# Patient Record
Sex: FEMALE | Race: WHITE | NOT HISPANIC OR LATINO | Employment: UNEMPLOYED | URBAN - METROPOLITAN AREA
[De-identification: names, ages, dates, MRNs, and addresses within clinical notes are randomized per-mention and may not be internally consistent; named-entity substitution may affect disease eponyms.]

---

## 2022-01-18 ENCOUNTER — TELEPHONE (OUTPATIENT)
Dept: TRANSPLANT | Facility: CLINIC | Age: 62
End: 2022-01-18

## 2022-01-18 NOTE — TELEPHONE ENCOUNTER
Patient is currently listed at [facility], she reports she was turned down by [facility], [facility], and [facility]. Worked up at [facility] as well. Is considering referral to [facility] as well, she states she lived there for years. Reports GFR 18, she is pre-dialysis.    Type one IDDM for 51 years.    Patient provided with address to transplant team, fax number for her provider to send referral information, notes/demographics/insurance/labs. She states she will send CD of CT abd/pelvis done November / December 2021.    She reports she has two sisters as well, that are being worked up as donors.    ----- Message from Henrry Davis sent at 1/18/2022  2:24 PM CST -----  Regarding: CALL BACK  PT call to speak Dr Monte in regards to some question she has requesting call back    Call

## 2022-02-02 ENCOUNTER — TELEPHONE (OUTPATIENT)
Dept: TRANSPLANT | Facility: CLINIC | Age: 62
End: 2022-02-02

## 2022-02-02 ENCOUNTER — DOCUMENTATION ONLY (OUTPATIENT)
Dept: TRANSPLANT | Facility: CLINIC | Age: 62
End: 2022-02-02

## 2022-02-02 NOTE — PROGRESS NOTES
Received CT abd/pelvis dated 12/15/20 & 11/22/21. Notified patient information is received, routed CD of CT's to film library for upload. Will route to surgeon for review when uploaded.

## 2022-02-02 NOTE — TELEPHONE ENCOUNTER
Contacted patient to notify of receipt of imaging. Left voicemail, provided return phone number if any questions.

## 2022-03-02 ENCOUNTER — TELEPHONE (OUTPATIENT)
Dept: TRANSPLANT | Facility: CLINIC | Age: 62
End: 2022-03-02

## 2022-03-02 NOTE — TELEPHONE ENCOUNTER
Returned patient call, she is aware that she is pending surgeon review of imaging in order to proceed with referral [place]      ----- Message from Henrry Davis sent at 3/2/2022 11:24 AM CST -----  Regarding: call back  Pt call to speak with Misty in regards to pt's CT requesting call back    Call

## 2022-03-15 ENCOUNTER — TELEPHONE (OUTPATIENT)
Dept: TRANSPLANT | Facility: CLINIC | Age: 62
End: 2022-03-15

## 2022-03-15 NOTE — TELEPHONE ENCOUNTER
Contacted patient to notify of authorization to proceed with referral per transplant surgeon. Patient verbalized understanding that she is considered at high risk. She reports she has been evaluated in person at [facility], [facility], [facility], and [facility]. She reports that she is still in process with having her sister evaluated at [facility]. She states she is aware that her provider (Dr. Cecy Reilly) will need to complete referral form and submit records (contacted office, obtained fax number-[phone]). All questions answered at this time.    ----- Message from Khris Wilburn Jr., MD sent at 3/15/2022  1:35 PM CDT -----  There are areas on the common iliac arteries that are feasible, but remains high risk. Proceed with eval.   ----- Message -----  From: Misty Stewart  Sent: 3/15/2022   1:21 PM CDT  To: Khris Wilburn Jr., MD    As of January her two sisters were being worked up, but not completed. My take away was she is looking for multi-center listing, she appears to be listed at [facility] as well as [facility] (per patient report). Please advise if she appears to be a candidate or if her vasculature would prohibit transplant. Thank you!    This is from a note from [facility] (April 2021):     Past Medical history:  - CKD4 presumably from Type 1 DM, kidney disease was diagnosed 18 yrs ago. Hisorically she hasnt had much proteinuria though  - Type 1 DM diagnosed at age 10. She does have diabetic retinopathy, gastroperesis and hypoglycemic unawareness. On insulin and dexcom monitor. 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 Viral infections positive for: VZV    Exercise tolerance:   Can walk a mile    Overall physical fitness by Karnofsky:  90 - Able to carry on normal activity; minor signs or symptoms of disease     Infections:   None    Cancers:   None    Coagulopathy issues:  None    Problems with General anesthesia:   Not known            I think she is trying to   ----- Message -----  From: Khris Wilburn Jr., MD  Sent: 3/15/2022  12:43 PM CDT  To: Misty Stewart    Its pretty marginal.  The external iliac arteries are not usable.  The common seems possible on CT from 11/2021, but only if shes a very excellent candidate otherwise.    I see she's listed at [facility], so I take it she doesn't have a living donor?     DS    ----- Message -----  From: Misty Stewart  Sent: 3/2/2022  11:51 AM CDT  To: Khris Wilburn Jr., MD    Please review these CT's / outside imaging. Patient self-referred and is aware she has been declined at other centers due to imaging. Please advise if ok to proceed with kidney transplant referral based on review. Thank you.

## 2022-03-23 ENCOUNTER — TELEPHONE (OUTPATIENT)
Dept: TRANSPLANT | Facility: CLINIC | Age: 62
End: 2022-03-23

## 2022-03-29 ENCOUNTER — TELEPHONE (OUTPATIENT)
Dept: TRANSPLANT | Facility: CLINIC | Age: 62
End: 2022-03-29

## 2022-03-29 DIAGNOSIS — Z76.82 ORGAN TRANSPLANT CANDIDATE: Primary | ICD-10-CM

## 2022-03-29 NOTE — TELEPHONE ENCOUNTER
Arranged test kit to be sent to patient for crossmatch.  All questions answered and patient verbalized understanding to all information provided.

## 2022-04-07 ENCOUNTER — TELEPHONE (OUTPATIENT)
Dept: TRANSPLANT | Facility: CLINIC | Age: 62
End: 2022-04-07
Payer: MEDICARE

## 2022-04-07 NOTE — TELEPHONE ENCOUNTER
Returned patient call, she reports she did speak with Rylan, and is aware requisite documentation has not been received. She states that her sister is being evaluated at [facility] now, and she is afraid she will need to schedule, although she would prefer surgery at [facility].     She states she will follow up with her local nephrologist to obtain needed referral forms and proceed from there. She is aware there are appointments available during April. She is requesting possibly April 26 or 27.    ----- Message from Henrry Davis sent at 4/7/2022 10:53 AM CDT -----  Regarding: call back  Pt call to speak with Misty in regards to her being excepted at another hospital for surgery     Call

## 2022-04-18 ENCOUNTER — TELEPHONE (OUTPATIENT)
Dept: TRANSPLANT | Facility: CLINIC | Age: 62
End: 2022-04-18
Payer: MEDICARE

## 2022-04-18 NOTE — TELEPHONE ENCOUNTER
"Returned patient call, she is aware referral was received (pages 25-33) on Friday, 4/14 (offices were closed). She is aware the referral will be processed as quickly as possible. We will need complete referral, this will be requested by MA. Patient is attempting to come to [facility] on 4/26, 27, or 28.    All questions answered at this time.   ----- Message from Nick Hendrix RN sent at 4/18/2022  3:09 PM CDT -----  Regarding: FW: speak to coordinator    ----- Message -----  From: Karuna Espino  Sent: 4/18/2022   3:04 PM CDT  To: [facility] Pre-Kidney Transplant Clinical  Subject: speak to coordinator                             Patient calling to speak to coordinator regarding patient status.    Is patient scheduled for 4/27/2022 as she was instructed to  "pencil in" that date? [facility] is ready to schedule transplant in May if [facility] is not ready. Patient prefers [facility]. Patient can stay in town longer if she knows the status. Is there anything else we are still waiting for?    Call: [phone] (Mobile)        "

## 2022-04-19 ENCOUNTER — TELEPHONE (OUTPATIENT)
Dept: TRANSPLANT | Facility: CLINIC | Age: 62
End: 2022-04-19
Payer: MEDICARE

## 2022-04-19 ENCOUNTER — PATIENT MESSAGE (OUTPATIENT)
Dept: TRANSPLANT | Facility: CLINIC | Age: 62
End: 2022-04-19
Payer: MEDICARE

## 2022-04-19 DIAGNOSIS — Z76.82 ORGAN TRANSPLANT CANDIDATE: Primary | ICD-10-CM

## 2022-04-25 NOTE — PROGRESS NOTES
Spoke to patient to complete her history for her upcoming [facility] appointment her sister will come with her to her appointment she is aware that she have to bring a small breakfast/snack to eat after blood is drawn she will not need a

## 2022-04-26 ENCOUNTER — HOSPITAL ENCOUNTER (OUTPATIENT)
Dept: RADIOLOGY | Facility: HOSPITAL | Age: 62
Discharge: HOME | End: 2022-04-26
Attending: NURSE PRACTITIONER
Payer: MEDICARE

## 2022-04-26 ENCOUNTER — OFFICE VISIT (OUTPATIENT)
Dept: TRANSPLANT | Facility: CLINIC | Age: 62
End: 2022-04-26
Payer: MEDICARE

## 2022-04-26 VITALS
SYSTOLIC BLOOD PRESSURE: 133 MMHG | HEART RATE: 59 BPM | RESPIRATION RATE: 16 BRPM | OXYGEN SATURATION: 99 % | DIASTOLIC BLOOD PRESSURE: 63 MMHG | TEMPERATURE: 97 F | HEIGHT: 66 IN | BODY MASS INDEX: 25.01 KG/M2 | WEIGHT: 155.63 LBS

## 2022-04-26 DIAGNOSIS — Z76.82 ORGAN TRANSPLANT CANDIDATE: ICD-10-CM

## 2022-04-26 DIAGNOSIS — N18.4 TYPE 1 DIABETES MELLITUS WITH STAGE 4 CHRONIC KIDNEY DISEASE: ICD-10-CM

## 2022-04-26 DIAGNOSIS — I10 PRIMARY HYPERTENSION: ICD-10-CM

## 2022-04-26 DIAGNOSIS — E10.22 TYPE 1 DIABETES MELLITUS WITH STAGE 4 CHRONIC KIDNEY DISEASE: ICD-10-CM

## 2022-04-26 DIAGNOSIS — Z01.818 PRE-TRANSPLANT EVALUATION FOR KIDNEY TRANSPLANT: Primary | ICD-10-CM

## 2022-04-26 DIAGNOSIS — N25.81 SECONDARY HYPERPARATHYROIDISM: ICD-10-CM

## 2022-04-26 DIAGNOSIS — N18.4 CKD (CHRONIC KIDNEY DISEASE), STAGE IV: ICD-10-CM

## 2022-04-26 PROBLEM — E10.9 TYPE 1 DIABETES MELLITUS: Status: ACTIVE | Noted: 2022-04-26

## 2022-04-26 PROCEDURE — 99205 OFFICE O/P NEW HI 60 MIN: CPT | Mod: S$PBB,TXP,, | Performed by: NURSE PRACTITIONER

## 2022-04-26 PROCEDURE — 72170 X-RAY EXAM OF PELVIS: CPT | Mod: TC,TXP

## 2022-04-26 PROCEDURE — 97802 MEDICAL NUTRITION INDIV IN: CPT | Mod: PBBFAC,TXP | Performed by: DIETITIAN, REGISTERED

## 2022-04-26 PROCEDURE — 99205 PR OFFICE/OUTPT VISIT, NEW, LEVL V, 60-74 MIN: ICD-10-PCS | Mod: S$PBB,TXP,, | Performed by: NURSE PRACTITIONER

## 2022-04-26 PROCEDURE — 93978 VASCULAR STUDY: CPT | Mod: 26,TXP,, | Performed by: RADIOLOGY

## 2022-04-26 PROCEDURE — 76770 US EXAM ABDO BACK WALL COMP: CPT | Mod: 26,TXP,, | Performed by: RADIOLOGY

## 2022-04-26 PROCEDURE — 72170 X-RAY EXAM OF PELVIS: CPT | Mod: 26,TXP,, | Performed by: RADIOLOGY

## 2022-04-26 PROCEDURE — 99204 OFFICE O/P NEW MOD 45 MIN: CPT | Mod: S$PBB,TXP,, | Performed by: TRANSPLANT SURGERY

## 2022-04-26 PROCEDURE — 71046 X-RAY EXAM CHEST 2 VIEWS: CPT | Mod: TC,TXP

## 2022-04-26 PROCEDURE — 76770 US EXAM ABDO BACK WALL COMP: CPT | Mod: TC,TXP

## 2022-04-26 PROCEDURE — 72170 XR PELVIS ROUTINE AP: ICD-10-PCS | Mod: 26,TXP,, | Performed by: RADIOLOGY

## 2022-04-26 PROCEDURE — 99999 PR PBB SHADOW E&M-EST. PATIENT-LVL V: ICD-10-PCS | Mod: PBBFAC,TXP,, | Performed by: NURSE PRACTITIONER

## 2022-04-26 PROCEDURE — 93978 US DOPP ILIACS BILATERAL: ICD-10-PCS | Mod: 26,TXP,, | Performed by: RADIOLOGY

## 2022-04-26 PROCEDURE — 93978 VASCULAR STUDY: CPT | Mod: TC,TXP

## 2022-04-26 PROCEDURE — 71046 XR CHEST PA AND LATERAL: ICD-10-PCS | Mod: 26,TXP,, | Performed by: RADIOLOGY

## 2022-04-26 PROCEDURE — 99215 OFFICE O/P EST HI 40 MIN: CPT | Mod: PBBFAC,25,TXP | Performed by: NURSE PRACTITIONER

## 2022-04-26 PROCEDURE — 71046 X-RAY EXAM CHEST 2 VIEWS: CPT | Mod: 26,TXP,, | Performed by: RADIOLOGY

## 2022-04-26 PROCEDURE — 76770 US RETROPERITONEAL COMPLETE: ICD-10-PCS | Mod: 26,TXP,, | Performed by: RADIOLOGY

## 2022-04-26 PROCEDURE — 99999 PR PBB SHADOW E&M-EST. PATIENT-LVL V: CPT | Mod: PBBFAC,TXP,, | Performed by: NURSE PRACTITIONER

## 2022-04-26 PROCEDURE — 99204 PR OFFICE/OUTPT VISIT, NEW, LEVL IV, 45-59 MIN: ICD-10-PCS | Mod: S$PBB,TXP,, | Performed by: TRANSPLANT SURGERY

## 2022-04-26 RX ORDER — NIFEDIPINE 90 MG/1
90 TABLET, EXTENDED RELEASE ORAL DAILY
COMMUNITY

## 2022-04-26 RX ORDER — IPRATROPIUM BROMIDE 21 UG/1
2 SPRAY, METERED NASAL
COMMUNITY

## 2022-04-26 RX ORDER — FEXOFENADINE HCL 60 MG
60 TABLET ORAL DAILY PRN
COMMUNITY

## 2022-04-26 RX ORDER — INSULIN GLARGINE 100 [IU]/ML
14 INJECTION, SOLUTION SUBCUTANEOUS DAILY
COMMUNITY

## 2022-04-26 RX ORDER — METOPROLOL SUCCINATE 25 MG/1
25 TABLET, EXTENDED RELEASE ORAL DAILY
COMMUNITY

## 2022-04-26 RX ORDER — CALCITRIOL 0.25 UG/1
0.25 CAPSULE ORAL DAILY
COMMUNITY

## 2022-04-26 RX ORDER — MELATONIN 5 MG
5 CAPSULE ORAL NIGHTLY
COMMUNITY

## 2022-04-26 RX ORDER — BUPROPION HYDROCHLORIDE 150 MG/1
150 TABLET ORAL DAILY
COMMUNITY

## 2022-04-26 RX ORDER — INSULIN LISPRO 100 [IU]/ML
1-10 INJECTION, SOLUTION INTRAVENOUS; SUBCUTANEOUS
COMMUNITY

## 2022-04-26 RX ORDER — ESCITALOPRAM OXALATE 20 MG/1
20 TABLET ORAL DAILY
COMMUNITY

## 2022-04-26 RX ORDER — ROSUVASTATIN CALCIUM 5 MG/1
5 TABLET, COATED ORAL DAILY
COMMUNITY

## 2022-04-26 NOTE — PROGRESS NOTES
Transplant Recipient Adult Psychosocial Assessment    Cyndi Clark  [address] [address]  Telephone Information:   Mobile [phone]   Home  [phone] (home)  Work  There is no work phone number on file.  E-mail  [email or web address]    Sex: female  YOB: 1960  Age: 62 y.o.    Encounter Date: 2022  U[place] Citizen: yes  Primary Language: English   Needed: no    Emergency Contact:  Jennifer Reyes, 60 yo sister, [place], does drive/own car, does work part time in surgeons office.  [phone]    Family/Social Support:   Number of dependents/: pt reports lives with her 84 yo mother Indira Clark who suffers from problem back pain. Pt reports having family nearby who will assist with mother for patient's transplant.  Marital history: pt reports is not   Other family dynamics: Pt reports father is recently . Pt reports is on [employer] through her former employer [employer] and lives with her mother Indira Clark in [place]. Pt reports past work history as  for 13 years for [employer] in [place]. Pt reports moved from [place] in with her mother in [place] once her health became to problematic to continue working and living on her own in [place]. Pt reports having 6 highly supportive sisters with one sister Vero Enriquez living nearby in [facility] [place]. Pt reports having living donor sister Carmen Nguyen. Pt reports sister Vero Enriquez will be primary transplant caregiver with other sisters as back up transplant caregivers as needed.     Household Composition:  Indira Clark, 84 yo mother, [place], does drive/own car, retired. [phone]    Do you and your caregivers have access to reliable transportation? yes  PRIMARY CAREGIVER: Vero Enriquez, sister ([facility] [place]), will be primary caregiver, phone number [phone]    Vero Enriquez, 66 yo sister, [facility], does  drive/own car, does not work (was  for [employer] [facility] [employer] in the past).  [phone]     provided in-depth information to patient and caregiver regarding pre- and post-transplant caregiver role.   strongly encourages patient and caregiver to have concrete plan regarding post-transplant care giving, including back-up caregiver(s) to ensure care giving needs are met as needed.    Patient and Caregiver states understanding all aspects of caregiver role/commitment and is able/willing/committed to being caregiver to the fullest extent necessary.    Patient and Caregiver verbalizes understanding of the education provided today and caregiver responsibilities.         remains available. Patient and Caregiver agree to contact  in a timely manner if concerns arise.      Able to take time off work without financial concerns: yes.     Additional Significant Others who will Assist with Transplant:  Aparna Orozco, 59 yo sister [facility], does drive/own car, works from home as patient advocate. [phone]  Jennifer Amy, 60 yo sister[place]is [place], does drive/own car, does work part time in surgeons office.  [phone]  Janette Park, 62 yo sisterDonis, does drive/own car, school counselor. [phone]  Helen Cabrera, 64 yo sisterRADHALA, does drive/own car, works part time at home as . [phone]    Living Will: no  Healthcare Power of : no  Advance Directives on file: <<no information> per medical record.  Verbally reviewed LW/HCPA information.   provided patient with copy of LW/HCPA documents and provided education on completion of forms.    Living Donors: Education and resource information given to patient. Carmen Nguyen, 54 yo sister     Highest Education Level: Associate/Bachelor Degree  Reading Ability: college  Reports difficulty with: N/A  Learns Best By:  multisensory     Status: no  VA Benefits: no     Working for  Income: No  If no, reason not working: Disability  Pt reports is on LTD through her former employer, [employer] and on SS disability (both starting 2017 due to diabetes and Charcot foot) and lives with her 82 yo mother Indira Peterson in [place]. Pt reports past work history as  for 13 years at [employer] in [place]. Pt reports moved from [place] in with her mother in [place] once her health became to problematic to continue working and living on her own in [place].    Spouse/Significant Other Employment: pt reports is not     Disabled: yes 2017 due to Diabetes issues and Charchot foot    Monthly Income:  LTD income: $1300    SS disability income: $2,000  Able to afford all costs now and if transplanted, including medications: yes  Patient and Caregiver verbalizes understanding of personal responsibilities related to transplant costs and the importance of having a financial plan to ensure that patients transplant costs are fully covered.       provided fundraising information/education. Patient and Caregiververbalizes understanding.   remains available.    Insurance:   Payer/Plan Subscr  Sex Relation Sub. Ins. ID Effective Group Num   1. MEDICARE - ME* ILANA PETERSON JESSICA 1960 Female Self [id number] 3/1/19                                    [address]   2. BLUE CROSS BL* KRISTENILANA LOPEZ 1960 Female Self [id number] 3/1/19 [id number]                                   [address] [address]     Primary Insurance (for UNOS reporting): Public Insurance - Medicare FFS (Fee For Service)  Secondary Insurance (for UNOS reporting): Private Insurance  Patient and Caregiver verbalizes clear understanding that patient may experience difficulty obtaining and/or be denied insurance coverage post-surgery. This includes and is not limited to disability insurance, life insurance, health insurance, burial insurance, long term care insurance, and  other insurances.      Patient and Caregiver also reports understanding that future health concerns related to or unrelated to transplantation may not be covered by patient's insurance.  Resources and information provided and reviewed.     Patient and Caregiver provides verbal permission to release any necessary information to outside resources for patient care and discharge planning.  Resources and information provided are reviewed.      Dialysis Adherence: Patient reports is not on dialysis at this time.      Nephrologist:   Cecy Reilly MD      Current Nephrologist     [phone]     Pt reports high compliance with nephrology appointments and instructions within last 3 months. Nephrology compliance update requested.    Infusion Service: patient utilizing? no  Home Health: patient utilizing? no  DME: yes insulin pens; Dexcon continuous glucose monitor; glucopen  Pulmonary/Cardiac Rehab: pt denies   ADLS:  independent    Adherence:   Pt reports having high medical compliance with appointments and instructions within last 3 months.  Adherence education and counseling provided.     Per History Section:  Past Medical History:   Diagnosis Date    Anemia     Arthritis     Cataract     Depression     Diabetes mellitus type I     Disorder of kidney and ureter     HPTH (hyperparathyroidism)     Hyperlipidemia     Hypertension     Secondary hyperparathyroidism     Thyroid disease      Social History     Tobacco Use    Smoking status: Former Smoker     Years: 25.00     Types: Cigarettes     Quit date: 1997     Years since quittin.3    Smokeless tobacco: Never Used   Substance Use Topics    Alcohol use: Not Currently     Social History     Substance and Sexual Activity   Drug Use Never     Social History     Substance and Sexual Activity   Sexual Activity Not Currently       Per Today's Psychosocial:  Tobacco: none, patient denies any use.  Alcohol: seldom. 1 drink about every 3 months.  Illicit  Drugs/Non-prescribed Medications: none, patient denies any use.    Patient and Caregiver states clear understanding of the potential impact of substance use as it relates to transplant candidacy and is aware of possible random substance screening.  Substance abstinence/cessation counseling, education and resources provided and reviewed.     Arrests/DWI/Treatment/Rehab: patient denies    Psychiatric History:    Mental Health: Pt reports adjustment disorder with depressed feelings since around 2017 when her medical problems required her to stop working and move back in with family. Pt reports medical PCP prescribes both Lexapro and Wellbutrin for treatment Pt reports did go up on dosage for Lexapro when father recently  with doctor's permission. Patient reports is feeling less depressed at this time. Pt reports high family support. Pt denies any need for mental health referral at this time.  Psychiatrist/Counselor: pt denies  Medications:  Lexapro 20 mg and Wellburtin 150 mg prescribed by PCP  Suicide/Homicide Issues: pt denies   Safety at home: Pt reports living in safe home environment with no abuse.    Knowledge: Patient and Caregiver states having clear understanding and realistic expectations regarding the potential risks and potential benefits of organ transplantation and organ donation and agrees to discuss with health care team members and support system members, as well as to utilize available resources and express questions and/or concerns in order to further facilitate the pt informed decision-making.  Resources and information provided and reviewed.    Patient and Caregiver is aware of [facility]'s affiliation and/or partnership with agencies in home health care, LTAC, SNF, [facility], and other hospitals and clinics.    Understanding: Patient and Caregiver reports having a clear understanding of the many lifetime commitments involved with being a transplant recipient, including costs, compliance, medications,  lab work, procedures, appointments, concrete and financial planning, preparedness, timely and appropriate communication of concerns, abstinence (ETOH, tobacco, illicit non-prescribed drugs), adherence to all health care team recommendations, support system and caregiver involvement, appropriate and timely resource utilization and follow-through, mental health counseling as needed/recommended, and patient and caregiver responsibilities.  Social Service Handbook, resources and detailed educational information provided and reviewed.  Educational information provided.    Patient and Caregiver also reports current and expected compliance with health care regime and states having a clear understanding of the importance of compliance.      Patient and Caregiver reports a clear understanding that risks and benefits may be involved with organ transplantation and with organ donation.       Patient and Caregiver also reports clear understanding that psychosocial risk factors may affect patient, and include but are not limited to feelings of depression, generalized anxiety, anxiety regarding dependence on others, post traumatic stress disorder, feelings of guilt and other emotional and/or mental concerns, and/or exacerbation of existing mental health concerns.  Detailed resources provided and discussed.      Patient and Caregiver agrees to access appropriate resources in a timely manner as needed and/or as recommended, and to communicate concerns appropriately.  Patient and Caregiver also reports a clear understanding of treatment options available.     Patient and Caregiver received education in a group setting.   reviewed education, provided additional information, and answered questions.    Feelings or Concerns: Pt reports high motivation to pursue organ transplant at this time. Pt reports has been evaluated for organ transplanted at other transplant centers -- reports is listed at [facility]  [place].    Coping: Identify Patient & Caregiver Strategies to [place]:   1. Currently & Pre-transplant - family support; watch movies (especially enjoys [employer] channel), is very social with friends near and far; has been volunteering at local hospital until Covid.   2. At the time of surgery - family support   3. During post-Transplant & Recovery Period - family support    Goals: Pt reports hope for successful kidney organ transplant so she may not be placed on dialysis and have healthier life.  Patient referred to Vocational Rehabilitation.    Interview Behavior: Patient and Caregiver presents as alert and oriented x 4, pleasant, good eye contact, well groomed, recall good, concentration/judgement good, average intelligence, calm, communicative, cooperative and asking and answering questions appropriately. Pt's sister Vero Enriqeuz in session with patient's permission.         Transplant Social Work - Candidacy  Assessment/Plan:     Psychosocial Suitability: Patient presents as low risk candidate for kidney transplant at this time. Based on psychosocial risk factors, patient presents as low risk, due to patient denying any biopsychosocial barriers to organ transplant at this time. Pt reports having organ transplant caregiver/transportation plan, medical insurance plan and plan to afford transplant costs all in place.    Recommendations/Additional Comments: Nephrology compliance update requested. Pt reports lives away in [place] but sister Vero lives nearby [facility] and most likely patient will stay with sister in [place] or stay in [facility] (pt reports understanding  hotel is personal out of pocket expense).     SW recommends that pt conduct fundraising to assist pt with pay for cost of medications, food, gas, and other transplant related needs. SW recommends that pt remain aware of potential mental health concerns and contact the team if any concerns arise. SW recommends that pt remain  abstinent from tobacco, ETOH, and drug use. SW supports pt's continued adherence. SW remains available to answer any questions or concerns that arise as the pt moves through the transplant process.     Final determination of transplant candidacy will be reviewed by the selection committee.     Amanda BRITO LCSW

## 2022-04-26 NOTE — PROGRESS NOTES
Transplant Surgery  Kidney Transplant Recipient Evaluation    Referring Physician: Cecy Reilly  Current Nephrologist: Cecy Reilly    Subjective:     Reason for Visit: evaluate transplant candidacy    History of Present Illness: Cyndi Clark is a 62 y.o. year old female undergoing transplant evaluation.    Dialysis History: Cyndi is pre-dialysis.      Transplant History: N/A    Etiology of Renal Disease: Diabetes Mellitus - Type I (based on medical records from referral).    External provider notes reviewed: Yes    Review of Systems   Constitutional: Negative for activity change and chills.   HENT: Negative for congestion and sore throat.    Eyes: Negative for discharge and redness.   Respiratory: Negative for cough and shortness of breath.    Cardiovascular: Negative for chest pain and palpitations.   Gastrointestinal: Negative for nausea and vomiting.   Endocrine: Negative for polydipsia and polyuria.   Genitourinary: Negative for dysuria and frequency.   Musculoskeletal: Negative for arthralgias and gait problem.   Skin: Negative for pallor and rash.   Neurological: Negative for dizziness and light-headedness.   Hematological: Negative for adenopathy. Does not bruise/bleed easily.   Psychiatric/Behavioral: Negative for agitation and suicidal ideas.     Objective:     Physical Exam:  Constitutional:   Vitals reviewed: yes   Well-nourished and well-groomed: yes  Eyes:   Sclerae icteric: no   Extraocular movements intact: yes  GI:    Bowel sounds normal: yes   Tenderness: no    If yes, quadrant/location: not applicable   Palpable masses: no    If yes, quadrant/location: not applicable   Hepatosplenomegaly: no   Ascites: no   Hernia: no    If yes, type/location: not applicable   Surgical scars: no    If yes, type/location: not applicable  Resp:   Effort normal: yes   Breath sounds normal: yes    CV:   Regular rate and rhythm: yes   Heart sounds normal: yes   Femoral pulses normal: yes   Extremities edematous:  no  Skin:   Rashes or lesions present: no    If yes, describe:not applicable   Jaundice:: no    Musculoskeletal:   Gait normal: yes   Strength normal: yes  Psych:   Oriented to person, place, and time: yes   Affect and mood normal: yes    Additional comments: not applicable    Diagnostics:  The following labs have been reviewed: CBC  CMP  The following radiology images have been independently reviewed and interpreted: CT Abd/Pelvis    Counseling: We provided Cyndi Clark with a group education session today.  We discussed kidney transplantation at length with her, including risks, potential complications, and alternatives in the management of her renal failure.  The discussion included complications related to anesthesia, bleeding, infection, primary nonfunction, and ATN.  I discussed the typical postoperative course, length of hospitalization, the need for long-term immunosuppression, and the need for long-term routine follow-up.  I discussed living-donor and -donor transplantation and the relative advantages and disadvantages of each.  I also discussed average waiting times for both living donation and  donation.  I discussed national and center-specific survival rates.  I also mentioned the potential benefit of multicenter listing to candidates listed with centers within more than one organ procurement organization.  All questions were answered.    Patient advised that it is recommended that all transplant candidates, and their close contacts and household members receive Covid vaccination.    Final determination of transplant candidacy will be made once evaluation is complete and reviewed by the Kidney & Kidney/Pancreas Selection Committee.    Coronavirus disease (COVID-19) caused by severe acute respiratory virus coronavirus 2 (SARS-C0V 2) is associated with increased mortality in solid organ transplant recipients (SOT) compared to non-transplant patients. Vaccine responses to vaccination are  depressed against SARS-CoV2 compared to normal individuals but improve with third vaccination doses. Vaccination prior to SOT provides both the best opportunity for transplant candidates to develop protective immunity and to reduce the risk of serious COVID19 infections post transplantation. Organ transplant candidates at [facility] will be required to receive SARS-CoV-2 vaccination prior to being listed with a an active status, whenever possible. Exceptions will be made for disability related reasons or for sincerely held [employer] beliefs.          Transplant Surgery - Candidacy   Assessment/Plan:   Cyndi Clark is pre-dialysis with CKD stage 4 (GFR 15-29 mL/min). I have concerns with her iliac calcifications. Based on available information, Cyndi Clark is a high-risk kidney transplant candidate. She has a history of type 1 DM since age 10 now with extensive visceral and iliac artery calcifications. She has some sparing of her common iliac arteries. Imaging should be reviewed in committee to determine surgical candidacy and approach. She is thoughtful and well informed and would like more information on our approach to steroid minimization. She has a living donor and currently accepted at [facility]. Pt reports being declined by [facility].    Additional testing to be completed according to the Written Order Guidelines for Adult Pre-kidney and Pancreas Transplant Evaluation (KI-02).  Interpretation of tests and discussion of patient management involves all members of the multidisciplinary transplant team.    Jsoh Carpenter MD

## 2022-04-26 NOTE — PROGRESS NOTES
INITIAL PATIENT EDUCATION NOTE    Ms. Cyndi Clark was seen in pre-kidney transplant clinic for evaluation for kidney, kidney/pancreas or pancreas only transplant.  The patient attended a an individual video education session that discussed/reviewed the following aspects of transplantation: evaluation and selection committee process, UNOS waitlist management/multiple listings, types of organs offered (KDPI < 85%, KDPI > 85%, PHS risk, DCD, HCV+, HIV+ for HIV+ recipients and enbloc/dual), financial aspects, surgical procedures, dietary instruction pre- and post-transplant, health maintenance pre- and post-transplant, post-transplant hospitalization and outpatient follow-up, potential to participate in a research protocol, and medication management and side effects.  A question and answer session was provided after the presentation.    The patient was seen by all members of the multi-disciplinary team to include: Nephrologist/PA, Surgeon, , Transplant Coordinator, , Pharmacist and Dietician (if applicable).    The patient reviewed and signed all consents for evaluation which were witnessed and sent to scanning into the EPIC chart.    The patient was given an education book and plan for further evaluation based on her individual assessment.      Reviewed program requirement for complete COVID vaccination with documentation prior to listing.  COVID education information reviewed with patient.     The patient was informed that the transplant team would manage immediate post op pain. If the patient requires long term pain management, they will need to have that pain management addressed by their PCP or previous provider who wrote for long term pain medicines.    The patient was encouraged to call with any questions or concerns.

## 2022-04-26 NOTE — PROGRESS NOTES
PHARM.D. PRE-TRANSPLANT NOTE:    This patient's medication therapy was evaluated as part of her pre-transplant evaluation.      The following general pharmacologic concerns were noted: none    The following concerns for post-operative pain management were noted: none    The following pharmacologic concerns related to HCV therapy were noted: none      This patient's medication profile was reviewed for considerations for DAA Hepatitis C therapy:    [x]  No current inducers of CYP 3A4 or PGP  [x]  No amiodarone on this patient's EMR profile in the last 24 months  [x]  No past or current atrial fibrillation on this patient's EMR profile       Current Outpatient Medications   Medication Sig Dispense Refill    buPROPion (WELLBUTRIN XL) 150 MG TB24 tablet Take 150 mg by mouth once daily.      calcitRIOL (ROCALTROL) 0.25 MCG Cap Take 0.25 mcg by mouth once daily.      EScitalopram oxalate (LEXAPRO) 20 MG tablet Take 20 mg by mouth once daily.      fexofenadine (ALLEGRA) 60 MG tablet Take 60 mg by mouth daily as needed.      insulin (LANTUS SOLOSTAR U-100 INSULIN) glargine 100 units/mL (3mL) SubQ pen Inject 14 Units into the skin once daily.      insulin lispro (HUMALOG KWIKPEN INSULIN) 100 unit/mL pen Inject 1-10 Units into the skin 3 (three) times daily with meals.      ipratropium (ATROVENT) 21 mcg (0.03 %) nasal spray 2 sprays by Nasal route every 12 (twelve) hours.      melatonin 5 mg Cap Take 5 mg by mouth every evening.      metoprolol succinate (TOPROL-XL) 25 MG 24 hr tablet Take 25 mg by mouth once daily.      NIFEdipine (PROCARDIA-XL) 90 MG (OSM) 24 hr tablet Take 90 mg by mouth once daily.      rosuvastatin (CRESTOR) 5 MG tablet Take 5 mg by mouth once daily.       No current facility-administered medications for this visit.           I am available for consultation and can be contacted, as needed by the other members of the Transplant team.

## 2022-04-26 NOTE — PROGRESS NOTES
Transplant Nephrology  Kidney Transplant Recipient Evaluation    Referring Physician: Cecy Reilly  Current Nephrologist: Cecy Reilly    Subjective:   CC:  Initial evaluation of kidney transplant candidacy.    HPI:  Ms. Clark is a 62 y.o. year old White female who has presented to be evaluated as a potential kidney transplant recipient.  She has advanced kidney disease secondary to diabetic nephropathy.  Patient is currently pre-dialysis. She does not have a dialysis access.     COVID vaccinated.    Imaging reviewed by surgery prior to evaluation, deemed high risk but OK to proceed with evaluation. She has been declined at other centers due to vascular calcification.    DM1 since age 10.  + retinopathy and gastroparesis. Has dexcom.  A1c today 8.2.    HTN since 1995, well controlled.    Denies MI, CVA, PE/DVT, malignancy.     Very active and functional, not frail, looks great.      Sister in evaluation to be potential donor.    Current Outpatient Medications   Medication Sig Dispense Refill    buPROPion (WELLBUTRIN XL) 150 MG TB24 tablet Take 150 mg by mouth once daily.      calcitRIOL (ROCALTROL) 0.25 MCG Cap Take 0.25 mcg by mouth once daily.      EScitalopram oxalate (LEXAPRO) 20 MG tablet Take 20 mg by mouth once daily.      fexofenadine (ALLEGRA) 60 MG tablet Take 60 mg by mouth daily as needed.      insulin (LANTUS SOLOSTAR U-100 INSULIN) glargine 100 units/mL (3mL) SubQ pen Inject 14 Units into the skin once daily.      insulin lispro (HUMALOG KWIKPEN INSULIN) 100 unit/mL pen Inject 1-10 Units into the skin 3 (three) times daily with meals.      ipratropium (ATROVENT) 21 mcg (0.03 %) nasal spray 2 sprays by Nasal route every 12 (twelve) hours.      melatonin 5 mg Cap Take 5 mg by mouth every evening.      metoprolol succinate (TOPROL-XL) 25 MG 24 hr tablet Take 25 mg by mouth once daily.      NIFEdipine (PROCARDIA-XL) 90 MG (OSM) 24 hr tablet Take 90 mg by mouth once daily.       rosuvastatin (CRESTOR) 5 MG tablet Take 5 mg by mouth once daily.       No current facility-administered medications for this visit.       Past Medical History:   Diagnosis Date    Anemia     Arthritis     Cataract     Depression     Diabetes mellitus type I     Disorder of kidney and ureter     HPTH (hyperparathyroidism)     Hyperlipidemia     Hypertension     Secondary hyperparathyroidism     Thyroid disease        Past Medical and Surgical History: Ms. Clark  has a past medical history of Anemia, Arthritis, Cataract, Depression, Diabetes mellitus type I, Disorder of kidney and ureter, HPTH (hyperparathyroidism), Hyperlipidemia, Hypertension, Secondary hyperparathyroidism, and Thyroid disease.  She has a past surgical history that includes Eye surgery.    Past Social and Family History: Ms. Clark reports that she quit smoking about 25 years ago. Her smoking use included cigarettes. She quit after 25.00 years of use. She has never used smokeless tobacco. She reports previous alcohol use. She reports that she does not use drugs. Her family history includes Arthritis in her mother; Heart disease in her father; Myelodysplastic syndrome in her father.    Review of Systems   Constitutional: Negative for activity change, appetite change and fever.   HENT: Negative for congestion, mouth sores and sore throat.    Eyes: Negative for visual disturbance.   Respiratory: Negative for cough, chest tightness and shortness of breath.    Cardiovascular: Negative for chest pain, palpitations and leg swelling.   Gastrointestinal: Negative for abdominal distention, constipation, diarrhea and nausea.   Genitourinary: Negative for difficulty urinating, frequency and hematuria.   Musculoskeletal: Negative for arthralgias, gait problem and myalgias.   Skin: Negative for wound.   Allergic/Immunologic: Negative for environmental allergies, food allergies and immunocompromised state.   Neurological: Negative for dizziness,  "weakness and numbness.   Psychiatric/Behavioral: Negative for sleep disturbance. The patient is not nervous/anxious.        Objective:   Blood pressure 133/63, pulse (!) 59, temperature 97.3 °F (36.3 °C), temperature source Temporal, resp. rate 16, height 5' 5.95" (1.675 m), weight 70.6 kg (155 lb 10.3 oz), SpO2 99 %.body mass index is 25.16 kg/m².    Physical Exam  Vitals and nursing note reviewed.   Constitutional:       Appearance: Normal appearance.   HENT:      Head: Normocephalic.   Cardiovascular:      Rate and Rhythm: Normal rate and regular rhythm.      Heart sounds: Normal heart sounds.   Pulmonary:      Effort: Pulmonary effort is normal.      Breath sounds: Normal breath sounds.   Abdominal:      General: Bowel sounds are normal. There is no distension.      Palpations: Abdomen is soft.      Tenderness: There is no abdominal tenderness.   Musculoskeletal:         General: Normal range of motion.   Skin:     General: Skin is warm and dry.   Neurological:      General: No focal deficit present.      Mental Status: She is alert.   Psychiatric:         Behavior: Behavior normal.         Labs:  Lab Results   Component Value Date    WBC 7.42 04/26/2022    HGB 11.0 (L) 04/26/2022    HCT 34.8 (L) 04/26/2022     04/26/2022    K 4.3 04/26/2022     04/26/2022    CO2 26 04/26/2022    BUN 41 (H) 04/26/2022    CREATININE 4.0 (H) 04/26/2022    EGFRNONAA 11.4 (A) 04/26/2022    CALCIUM 10.4 04/26/2022    PHOS 4.4 04/26/2022    ALBUMIN 3.8 04/26/2022    AST 13 04/26/2022    ALT 10 04/26/2022    .5 (H) 04/26/2022       Labs were reviewed with the patient.    Assessment:     1. Pre-transplant evaluation for kidney transplant    2. CKD (chronic kidney disease), stage IV    3. Type 1 diabetes mellitus with stage 4 chronic kidney disease    4. Primary hypertension    5. Secondary hyperparathyroidism    6. BMI 25.0-25.9,adult        Plan:   Prior to Listing, will need the following items to be completed:  1. " Standard serologies, cardiac and imaging studies   2. Cardiac clearance (DM)    Transplant Candidacy:   Based on available information, Ms. Clark is a suitable kidney transplant candidate.   Meets center eligibility for accepting HCV+ donor offer - yes.  Patient educated on HCV+ donors. Cyndi is willing to accept HCV+ donor offer - yes   Patient is a candidate for KDPI > 85 kidney donor offer - yes.  Final determination of transplant candidacy will be made once workup is complete and reviewed by the selection committee.    Patient advised that it is recommended that all transplant candidates, and their close contacts and household members receive Covid vaccination.    Kika De Los Santos, ANDREY       UNOS Patient Status  Functional Status: 90% - Able to carry on normal activity: minor symptoms of disease  Physical Capacity: No Limitations

## 2022-04-26 NOTE — LETTER
April 28, 2022        Cecy Reilly  [address]  [place] [address]  Phone: [phone]  Fax: [phone]             [facility] 1st Fl  [address]  [facility] [address]  Phone: [phone]   Patient: Cyndi Clark   MR Number: [id number]   YOB: 1960   Date of Visit: 4/26/2022       Dear Dr. Cecy Reilly    Thank you for referring Cyndi Clark to me for evaluation. Attached you will find relevant portions of my assessment and plan of care.    If you have questions, please do not hesitate to call me. I look forward to following Cyndi Clark along with you.    Sincerely,    Kika De Los Santos, ANDREY    Enclosure    If you would like to receive this communication electronically, please contact [email or web address] or [phone] to request [employer] access.    [employer] is a tool which provides read-only access to select patient information with whom you have a relationship. Its easy to use and provides real time access to review your patients record including encounter summaries, notes, results, and demographic information.    If you feel you have received this communication in error or would no longer like to receive these types of communications, please e-mail [email or web address]

## 2022-04-26 NOTE — PROGRESS NOTES
TRANSPLANT NUTRITIONAL ASSESSMENT    Referring Provider: Petar Monte MD    Reason for Visit: Pre-kidney transplant work-up (pre-dialysis)    Age: 62 y.o.  Sex: female    Patient Active Problem List   Diagnosis    Type 1 diabetes mellitus    Renal osteodystrophy    Proteinuria    Hypertension    CKD (chronic kidney disease), stage IV    Pre-transplant evaluation for kidney transplant     Past Medical History:   Diagnosis Date    Anemia     Arthritis     Cataract     Depression     Diabetes mellitus type I     Disorder of kidney and ureter     HPTH (hyperparathyroidism)     Hyperlipidemia     Hypertension     Secondary hyperparathyroidism     Thyroid disease      Lab Results   Component Value Date     (H) 04/26/2022    K 4.3 04/26/2022    PHOS 4.4 04/26/2022    CHOL 190 04/26/2022    HDL 73 04/26/2022    TRIG 144 04/26/2022    ALBUMIN 3.8 04/26/2022    HGBA1C 8.2 (H) 04/26/2022    CALCIUM 10.4 04/26/2022     Other Pertinent Labs: none    Current Outpatient Medications   Medication Sig    buPROPion (WELLBUTRIN XL) 150 MG TB24 tablet Take 150 mg by mouth once daily.    calcitRIOL (ROCALTROL) 0.25 MCG Cap Take 0.25 mcg by mouth once daily.    EScitalopram oxalate (LEXAPRO) 20 MG tablet Take 20 mg by mouth once daily.    fexofenadine (ALLEGRA) 60 MG tablet Take 60 mg by mouth daily as needed.    insulin (LANTUS SOLOSTAR U-100 INSULIN) glargine 100 units/mL (3mL) SubQ pen Inject 14 Units into the skin once daily.    insulin lispro (HUMALOG KWIKPEN INSULIN) 100 unit/mL pen Inject 1-10 Units into the skin 3 (three) times daily with meals.    ipratropium (ATROVENT) 21 mcg (0.03 %) nasal spray 2 sprays by Nasal route every 12 (twelve) hours.    melatonin 5 mg Cap Take 5 mg by mouth every evening.    metoprolol succinate (TOPROL-XL) 25 MG 24 hr tablet Take 25 mg by mouth once daily.    NIFEdipine (PROCARDIA-XL) 90 MG (OSM) 24 hr tablet Take 90 mg by mouth once daily.    rosuvastatin  "(CRESTOR) 5 MG tablet Take 5 mg by mouth once daily.     No current facility-administered medications for this visit.     Allergies: Ofloxacin    Ht Readings from Last 1 Encounters:   04/26/22 5' 5.95" (1.675 m)     Wt Readings from Last 1 Encounters:   04/26/22 70.6 kg (155 lb 10.3 oz)      BMI: Body mass index is 25.16 kg/m².    Usual Weight: 150-160 lb  Weight Change/Time: states she was around 170 lb about 1 yr - 6 months, reduced portion sizes/decreased appetite  Current Diet: regular  Appetite/Current Intake: good   Exercise/Physical Activity: functional in ADL's, can exercise without limitations  Nutritional/Herbal Supplements: melatonin  Potential Food/Medication Interactions: reviewed  Chewing/Swallowing Problems: none  Symptoms: none  Assessment of Lab Values: Glu 201, Ha1c 8.2  Support System: sister /caregiver present and voices support in pt's nutrition/diet    Estimated Kcal Need: 8307-5091 kcal (25-30 kcal/kg)  Estimated Protein Need: 57-71 gm (0.8-1 gm/kg)    Nutritional History:   Pt reports to have a good appetite, no n/v/d/abd pain/constipation. Pt has decreased and she eats smaller portions. She is consuming the following foods on a typical day:  B: coffee, creamer, peanut butter crackers (packaged), Protein waffle (may with peanut butter)  Snack: apple, maybe with peanut butter, grapes with BG is low, canned pineapple w/ cottage cheese  L: no meal, mainly snacks  D: bowl of cereal, out to eat - splits a meal/orders the kid size portion: Mexican food, burger, chicken sandwich, at home: grilled fish, vegetables include broccoli/cauliflower/zucchini/spinach rarely, likes eggs - scrambled on sandwich or egg salad with 1 piece of toast    Nutritional Diagnoses  Problem: food- and nutrition-related knowledge deficit  Etiology: r/t no recent education on pre kidney transplant nutrition recommendations  Symptoms: aeb diet recall, questions from pt    Educational Need? yes  Barriers: none " identified  Discussed with: patient and sister  Interventions: Patient taught nutrition information regarding Pre-liver transplant work-up.    Renal Nutrition Therapy packet reviewed (high/low food sources of K, Phos and protein, low sodium and fluid intake, emphasis on moderate protein intake).  Encouraged physical activity daily, regular exercise as tolerated, stay mobile.    Goals/Recommendations: diet adherence  Actions Taken: instruct/provide written information  Strategies Used: problem solving, goal setting, motivational interviewing  Patient and/or family comprehend instructions: yes , adherence expected  Outcome: Verbalizes understanding  Monitoring: Contact information provided, will f/u in clinic and communicate with the care team as needed.     Counseling Time: 15 minutes

## 2022-05-04 ENCOUNTER — DOCUMENTATION ONLY (OUTPATIENT)
Dept: TRANSPLANT | Facility: CLINIC | Age: 62
End: 2022-05-04
Payer: MEDICARE

## 2022-05-04 NOTE — NURSING
Patient was given during their RR visit a form indicating all testing required to complete their transplant evaluation. All the recommended studies must be completed and received by the transplant team before you can be presented to the transplant selection committee.    The following studies need to be obtained at home:    Cardiology consult  2D Echo with color flow doppler  Cardiac stress test  Colonoscopy  Mammography  Gynecologic exam    Patient verbalized understanding of the above.

## 2022-05-20 ENCOUNTER — EPISODE CHANGES (OUTPATIENT)
Dept: TRANSPLANT | Facility: CLINIC | Age: 62
End: 2022-05-20

## 2022-05-24 ENCOUNTER — TELEPHONE (OUTPATIENT)
Dept: TRANSPLANT | Facility: CLINIC | Age: 62
End: 2022-05-24
Payer: MEDICARE

## 2022-05-24 DIAGNOSIS — Z01.818 PRE-TRANSPLANT EVALUATION FOR KIDNEY TRANSPLANT: Primary | ICD-10-CM

## 2022-05-24 NOTE — TELEPHONE ENCOUNTER
Returned pts call regarding outside records. Informed pt that we have't received any records yet. Pt states she mailed records to [facility] a couple of weeks ago. I informed her that I'll check with [facility] about records.

## 2022-05-24 NOTE — TELEPHONE ENCOUNTER
----- Message from Vin Antunez sent at 5/24/2022 11:44 AM CDT -----  Regarding: speak to staff  Contact: Ycndi  Patient is calling to speak to staff regarding a letter received. Person appointed as contact is currently out of office. Patient requesting a call back.    Contact: [phone]

## 2022-05-25 ENCOUNTER — TELEPHONE (OUTPATIENT)
Dept: TRANSPLANT | Facility: CLINIC | Age: 62
End: 2022-05-25
Payer: MEDICARE

## 2022-05-25 NOTE — TELEPHONE ENCOUNTER
Spoke w/pt regarding outside records. I informed pt that I checked with [facility], and she states that she doesn't have any records. Pt will re send

## 2022-06-01 ENCOUNTER — TELEPHONE (OUTPATIENT)
Dept: TRANSPLANT | Facility: CLINIC | Age: 62
End: 2022-06-01
Payer: MEDICARE

## 2022-06-01 NOTE — TELEPHONE ENCOUNTER
Returned pts call and informed her that I have all records except her stress, echo, cards. Pt states that she's waiting on an appt. She will call with dates once scheduled.

## 2022-06-01 NOTE — TELEPHONE ENCOUNTER
----- Message from Nancy Enriquez RN sent at 5/31/2022  4:10 PM CDT -----  Regarding: FW: call back  Will you please see if we received all outside records.   ----- Message -----  From: Henrry Davis  Sent: 5/31/2022   3:19 PM CDT  To: [facility] Pre-Kidney Transplant Clinical  Subject: call back                                        Pt call to speak with Nancy in regards to transplant status    Call

## 2022-08-31 ENCOUNTER — TELEPHONE (OUTPATIENT)
Dept: TRANSPLANT | Facility: CLINIC | Age: 62
End: 2022-08-31
Payer: MEDICARE

## 2022-09-08 ENCOUNTER — TELEPHONE (OUTPATIENT)
Dept: TRANSPLANT | Facility: CLINIC | Age: 62
End: 2022-09-08
Payer: MEDICARE

## 2022-09-08 NOTE — TELEPHONE ENCOUNTER
----- Message from Khris Wilburn Jr., MD sent at 9/1/2022  4:16 PM CDT -----  Regarding: RE: ct to review  Ct from 2020, Patient with unacceptable external iliac arteries.  There is possibly some free area on the common iliac arteries. Doppler is marginal    If this were a brand new CT I would say ok for her to come through [facility] [place] etc and possibly be listed.  Being that the CT is almost 2 years old I think it needs to be repeated before listing. Frankly I think that's generous though and some would consider this infeasible.     Sending to Dr. Manley and Elif for their opinion.   ----- Message -----  From: Nancy Enriquez RN  Sent: 8/31/2022   3:43 PM CDT  To: Khris Wilburn Jr., MD  Subject: ct to review                                     Hi Dr Wilburn,  Will you please review CT downloaded in Epic and comment if favorable for transplant.    Thank You,  Nancy

## 2022-10-11 ENCOUNTER — TELEPHONE (OUTPATIENT)
Dept: TRANSPLANT | Facility: CLINIC | Age: 62
End: 2022-10-11
Payer: MEDICARE

## 2022-10-11 NOTE — TELEPHONE ENCOUNTER
----- Message from Sita Mccollum RN sent at 10/7/2022  4:58 PM CDT -----  Regarding: FW: Transplant Elsewhere    ----- Message -----  From: Vin Antunez  Sent: 10/7/2022   4:02 PM CDT  To: [facility] Pre-Kidney Transplant Non-Clinical  Subject: Transplant Elsewhere                             Patient called in to inform staff she has been transplanted in [place]. Requesting a call back to follow up.        Contact: [phone]

## 2022-10-11 NOTE — TELEPHONE ENCOUNTER
----- Message from Khris Wilburn Jr., MD sent at 10/6/2022  5:10 PM CDT -----  Regarding: RE: ct to review  As expected the more recent one looks worse.  Please proceed with closing. Thanks nancy.   BENJAMIN  ----- Message -----  From: Nancy Enriquez RN  Sent: 10/6/2022   4:54 PM CDT  To: John Manley MD, Khris Wilburn Jr., MD, #  Subject: RE: ct to review                                 Hi,  I understand the consensus but I wanted you guys to be aware that there is a more recent ct downloaded from 11/2021. Please let me know if I should proceed with closing.     Respectfully,  Nancy   ----- Message -----  From: Marcos Asencio MD  Sent: 9/13/2022   9:16 AM CDT  To: John Manley MD, Nancy Enriquez, RN, #  Subject: RE: ct to review                                 I think the extent of disease is prohibitive.   ----- Message -----  From: John Manley MD  Sent: 9/9/2022   4:30 PM CDT  To: Nancy Enriquez, RN, #  Subject: RE: ct to review                                 High risk, not far from prohibitive in 2020.  If she's highly motivated and otherwise pretty optimal would repeat CT before doing anything else, otherwise just close chart.  ----- Message -----  From: Khris Wilburn Jr., MD  Sent: 9/1/2022   4:18 PM CDT  To: John Manley MD, Nancy Enriquez, RN, #  Subject: RE: ct to review                                 Ct from 2020, Patient with unacceptable external iliac arteries.  There is possibly some free area on the common iliac arteries. Doppler is marginal    If this were a brand new CT I would say ok for her to come through [facility] [place] etc and possibly be listed.  Being that the CT is almost 2 years old I think it needs to be repeated before listing. Frankly I think that's generous though and some would consider this infeasible.     Sending to Dr. Manley and Elif for their opinion.   ----- Message -----  From: Nancy Enrqiuez RN  Sent: 8/31/2022   3:43 PM CDT  To: Khris BATISTA  Akila Hodges MD  Subject: ct to review                                     Hi Dr Wilburn,  Will you please review CT downloaded in Epic and comment if favorable for transplant.    Thank You,  Nancy

## 2022-10-13 NOTE — PROGRESS NOTES
Kidney transplant evaluation encounter closed due to the patient confirmed transplanted at another facility.